# Patient Record
(demographics unavailable — no encounter records)

---

## 2025-06-03 NOTE — ASSESSMENT
[Vaccines Reviewed] : Immunizations reviewed today. Please see immunization details in the vaccine log within the immunization flowsheet.  [FreeTextEntry1] : 43yF, PMH of Sickle Cell trait, R breast intraductal papilloma s/p R breast excisional biopsy here to establish care.

## 2025-06-03 NOTE — PLAN
[FreeTextEntry1] : 43yF, PMH of Sickle Cell trait, R breast intraductal papilloma s/p R breast excisional biopsy here to establish care.  #Sickle cell trait Denies SOB, chest pain, joint pain Hx of Sickle cell trait in biological mother - F/u CBC with anemia reflex  #HCM Here to establish care - Referral given for Gynecology, last pap smear was over 3 years ago- patient does not remember when it was last done - Mammogram done 04/2025 - F/u CBC, CMP, lipid panel, HBsAg, HbsAb, HbcAb IgM, HCV with reflex HCV RNA

## 2025-06-03 NOTE — HEALTH RISK ASSESSMENT
[Fair] :  ~his/her~ mood as fair [Monthly or less (1 pt)] : Monthly or less (1 point) [1 or 2 (0 pts)] : 1 or 2 (0 points) [Never (0 pts)] : Never (0 points) [No falls in past year] : Patient reported no falls in the past year [1] : 1) Little interest or pleasure doing things for several days (1) [0] : 2) Feeling down, depressed, or hopeless: Not at all (0) [Never] : Never [NO] : No [Change in mental status noted] : Change in mental status noted [With Significant Other] : lives with significant other [Employed] : employed [Graduate School] : graduate school [] :  [Feels Safe at Home] : Feels safe at home [Reports changes in vision] : Reports changes in vision [Reports changes in dental health] : Reports changes in dental health [Smoke Detector] : smoke detector [Carbon Monoxide Detector] : carbon monoxide detector [Seat Belt] :  uses seat belt [PHQ-2 Negative - No further assessment needed] : PHQ-2 Negative - No further assessment needed [Time Spent: ___ Minutes] : I spent [unfilled] minutes performing a depression screening for this patient. [Hepatitis C test offered] : Hepatitis C test offered [de-identified] : Physical activity at work [[id number]] : 1 [de-identified] : Eats home cooked meals and orders in occasionally [Sexually Active] : not sexually active [Reports changes in hearing] : Reports no changes in hearing [Sunscreen] : does not use sunscreen [Travel to Developing Areas] : does not  travel to developing areas [MammogramDate] : 04/2025 [PapSmearDate] : 2023 [ColonoscopyDate] : - [de-identified] : Has not seen an ophthalmologist in over 5 years
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
